# Patient Record
Sex: FEMALE | URBAN - METROPOLITAN AREA
[De-identification: names, ages, dates, MRNs, and addresses within clinical notes are randomized per-mention and may not be internally consistent; named-entity substitution may affect disease eponyms.]

---

## 2023-05-14 ENCOUNTER — AMB VIDEO VISIT (OUTPATIENT)
Dept: OTHER | Facility: HOSPITAL | Age: 33
End: 2023-05-14

## 2023-05-14 NOTE — CARE ANYWHERE EVISITS
Visit Summary for [facility]   BEAR - Gender: Female - Date of Birth: [id number]  Date: [id number] - Duration: 3 minutes  Patient: [facility]   BEAR  Provider: Ofelia Monroe    Patient Contact Information  Address  [address] [place] [address], [address] [facility]; [address] [facility]  [phone]    Visit Topics    Triage Questions   What is your current physical address in the event of a medical emergency? Answer []  Are you allergic to any medications? Answer []  Are you now or could you be pregnant? Answer []  Do you have any immune system compromise or chronic lung   disease? Answer []  Do you have any vulnerable family members in the home (infant, pregnant, cancer, elderly)? Answer []     Conversation Transcripts  [0A][0A] [Notification] You are connected with Ofelia Monroe, Adult Medicine [0A][Notification] Bala Spencer is located in [address] [place] [address]  [0A][Notification] Bala Spencer has shared health history  Shawna Leon  [0A]    Diagnosis  Acute upper respiratory infection, unspecified    Procedures  Value: [id number] Code: CPT-4 UNLISTED E&M SERVICE    Medications Prescribed    No prescriptions ordered    Provider Notes  [0A][0A] Mode of Communication:  audio[0A]History: Rossy Cordoba developed a sore throat which started  yesterday  it is red and painful  there are no white spots  there is no reported fever, chills, congestion[0A][0A]Past medical history:   well[0A]Medications: reviewed[0A]Allergies: nkda[0A]PHYSICAL EXAM: The following is taken from my personal audible examination of Pt CONSTITUTIONAL: Pt is not in any acute distress - is speaking in full sentences  [0A]ORAL/OROPHARYNGEAL:   No audible   hoarseness  [0A]RESPIRATORY: Negative for audible wheezing, cough and conversational dyspnea or stridor  [0A]PSYCH: Appropriate affect  Speech is coherent in casual conversation  [0A]The remainder of the physical exam is noncontributory[0A]Assessment:   Acute pharyngitis probably viral[0A]Diagnosis code: J02 9 Acute pharyngitis, unspecifiedPlan:  rest, increase fluids[0A] Home care: [0A]    Acetaminophen or ibuprofen as needed for pain or fever [0A]    May also use OTC throat sprays, gargle, or lozenges   [0A]    Referral or follow up: [0A]    As needed for worsening or if no improvement in 3 days [0A]do a covid test day 3, and 4 [0A]Additional recommendations: [0A]    If you received a prescription at this visit and you have a question or problem please   call [phone] for prescription assistance [0A]    Please print a copy of this note and send it to your regular doctor, or take it to your next visit so it may be included in your medical record [0A]    Please see your primary care provider on an   annual basis or more frequently if directed [0A]The patient voiced understanding and agreement with plan  [0A]    Electronically signed by: Trudy Browning(NPI [id number])